# Patient Record
Sex: MALE | Race: ASIAN | NOT HISPANIC OR LATINO
[De-identification: names, ages, dates, MRNs, and addresses within clinical notes are randomized per-mention and may not be internally consistent; named-entity substitution may affect disease eponyms.]

---

## 2019-02-04 ENCOUNTER — OTHER (OUTPATIENT)
Age: 69
End: 2019-02-04

## 2019-12-28 ENCOUNTER — INPATIENT (INPATIENT)
Facility: HOSPITAL | Age: 69
LOS: 1 days | Discharge: HOME | DRG: 287 | End: 2019-12-30
Attending: INTERNAL MEDICINE | Admitting: INTERNAL MEDICINE
Payer: MEDICARE

## 2019-12-28 VITALS
OXYGEN SATURATION: 98 % | SYSTOLIC BLOOD PRESSURE: 134 MMHG | HEIGHT: 71 IN | RESPIRATION RATE: 18 BRPM | WEIGHT: 158.51 LBS | TEMPERATURE: 98 F | HEART RATE: 75 BPM | DIASTOLIC BLOOD PRESSURE: 76 MMHG

## 2019-12-28 DIAGNOSIS — E78.1 PURE HYPERGLYCERIDEMIA: ICD-10-CM

## 2019-12-28 DIAGNOSIS — R07.9 CHEST PAIN, UNSPECIFIED: ICD-10-CM

## 2019-12-28 LAB
ALBUMIN SERPL ELPH-MCNC: 4 G/DL — SIGNIFICANT CHANGE UP (ref 3.3–5)
ALP SERPL-CCNC: 87 U/L — SIGNIFICANT CHANGE UP (ref 40–120)
ALT FLD-CCNC: 18 U/L — SIGNIFICANT CHANGE UP (ref 10–45)
ANION GAP SERPL CALC-SCNC: 13 MMOL/L — SIGNIFICANT CHANGE UP (ref 5–17)
APTT BLD: 32.6 SEC — SIGNIFICANT CHANGE UP (ref 27.5–36.3)
AST SERPL-CCNC: 22 U/L — SIGNIFICANT CHANGE UP (ref 10–40)
BASOPHILS # BLD AUTO: 0.03 K/UL — SIGNIFICANT CHANGE UP (ref 0–0.2)
BASOPHILS NFR BLD AUTO: 0.6 % — SIGNIFICANT CHANGE UP (ref 0–2)
BILIRUB SERPL-MCNC: 0.3 MG/DL — SIGNIFICANT CHANGE UP (ref 0.2–1.2)
BUN SERPL-MCNC: 14 MG/DL — SIGNIFICANT CHANGE UP (ref 7–23)
CALCIUM SERPL-MCNC: 9.5 MG/DL — SIGNIFICANT CHANGE UP (ref 8.4–10.5)
CHLORIDE SERPL-SCNC: 105 MMOL/L — SIGNIFICANT CHANGE UP (ref 96–108)
CK MB CFR SERPL CALC: 1.7 NG/ML — SIGNIFICANT CHANGE UP (ref 0–6.7)
CK SERPL-CCNC: 105 U/L — SIGNIFICANT CHANGE UP (ref 30–200)
CO2 SERPL-SCNC: 23 MMOL/L — SIGNIFICANT CHANGE UP (ref 22–31)
CREAT SERPL-MCNC: 1.07 MG/DL — SIGNIFICANT CHANGE UP (ref 0.5–1.3)
EOSINOPHIL # BLD AUTO: 0.44 K/UL — SIGNIFICANT CHANGE UP (ref 0–0.5)
EOSINOPHIL NFR BLD AUTO: 8.2 % — HIGH (ref 0–6)
GLUCOSE SERPL-MCNC: 131 MG/DL — HIGH (ref 70–99)
HCT VFR BLD CALC: 45.8 % — SIGNIFICANT CHANGE UP (ref 39–50)
HGB BLD-MCNC: 14.8 G/DL — SIGNIFICANT CHANGE UP (ref 13–17)
IMM GRANULOCYTES NFR BLD AUTO: 0.4 % — SIGNIFICANT CHANGE UP (ref 0–1.5)
INR BLD: 1.1 — SIGNIFICANT CHANGE UP (ref 0.88–1.16)
LYMPHOCYTES # BLD AUTO: 2.09 K/UL — SIGNIFICANT CHANGE UP (ref 1–3.3)
LYMPHOCYTES # BLD AUTO: 38.8 % — SIGNIFICANT CHANGE UP (ref 13–44)
MCHC RBC-ENTMCNC: 29.6 PG — SIGNIFICANT CHANGE UP (ref 27–34)
MCHC RBC-ENTMCNC: 32.3 GM/DL — SIGNIFICANT CHANGE UP (ref 32–36)
MCV RBC AUTO: 91.6 FL — SIGNIFICANT CHANGE UP (ref 80–100)
MONOCYTES # BLD AUTO: 0.45 K/UL — SIGNIFICANT CHANGE UP (ref 0–0.9)
MONOCYTES NFR BLD AUTO: 8.3 % — SIGNIFICANT CHANGE UP (ref 2–14)
NEUTROPHILS # BLD AUTO: 2.36 K/UL — SIGNIFICANT CHANGE UP (ref 1.8–7.4)
NEUTROPHILS NFR BLD AUTO: 43.7 % — SIGNIFICANT CHANGE UP (ref 43–77)
NRBC # BLD: 0 /100 WBCS — SIGNIFICANT CHANGE UP (ref 0–0)
PLATELET # BLD AUTO: 180 K/UL — SIGNIFICANT CHANGE UP (ref 150–400)
POTASSIUM SERPL-MCNC: 4.3 MMOL/L — SIGNIFICANT CHANGE UP (ref 3.5–5.3)
POTASSIUM SERPL-SCNC: 4.3 MMOL/L — SIGNIFICANT CHANGE UP (ref 3.5–5.3)
PROT SERPL-MCNC: 6.9 G/DL — SIGNIFICANT CHANGE UP (ref 6–8.3)
PROTHROM AB SERPL-ACNC: 12.5 SEC — SIGNIFICANT CHANGE UP (ref 10–12.9)
RBC # BLD: 5 M/UL — SIGNIFICANT CHANGE UP (ref 4.2–5.8)
RBC # FLD: 12.9 % — SIGNIFICANT CHANGE UP (ref 10.3–14.5)
SODIUM SERPL-SCNC: 141 MMOL/L — SIGNIFICANT CHANGE UP (ref 135–145)
TROPONIN T SERPL-MCNC: <0.01 NG/ML — SIGNIFICANT CHANGE UP (ref 0–0.01)
TROPONIN T SERPL-MCNC: <0.01 NG/ML — SIGNIFICANT CHANGE UP (ref 0–0.01)
WBC # BLD: 5.39 K/UL — SIGNIFICANT CHANGE UP (ref 3.8–10.5)
WBC # FLD AUTO: 5.39 K/UL — SIGNIFICANT CHANGE UP (ref 3.8–10.5)

## 2019-12-28 PROCEDURE — 93306 TTE W/DOPPLER COMPLETE: CPT | Mod: 26

## 2019-12-28 PROCEDURE — 93010 ELECTROCARDIOGRAM REPORT: CPT

## 2019-12-28 PROCEDURE — 99285 EMERGENCY DEPT VISIT HI MDM: CPT

## 2019-12-28 PROCEDURE — 71046 X-RAY EXAM CHEST 2 VIEWS: CPT | Mod: 26

## 2019-12-28 RX ORDER — ASPIRIN/CALCIUM CARB/MAGNESIUM 324 MG
81 TABLET ORAL DAILY
Refills: 0 | Status: DISCONTINUED | OUTPATIENT
Start: 2019-12-29 | End: 2019-12-30

## 2019-12-28 RX ORDER — NICOTINE POLACRILEX 2 MG
1 GUM BUCCAL DAILY
Refills: 0 | Status: DISCONTINUED | OUTPATIENT
Start: 2019-12-28 | End: 2019-12-30

## 2019-12-28 RX ORDER — CLOPIDOGREL BISULFATE 75 MG/1
75 TABLET, FILM COATED ORAL DAILY
Refills: 0 | Status: DISCONTINUED | OUTPATIENT
Start: 2019-12-29 | End: 2019-12-30

## 2019-12-28 RX ORDER — ENOXAPARIN SODIUM 100 MG/ML
40 INJECTION SUBCUTANEOUS EVERY 24 HOURS
Refills: 0 | Status: DISCONTINUED | OUTPATIENT
Start: 2019-12-28 | End: 2019-12-29

## 2019-12-28 RX ORDER — ACETAMINOPHEN 500 MG
650 TABLET ORAL ONCE
Refills: 0 | Status: COMPLETED | OUTPATIENT
Start: 2019-12-28 | End: 2019-12-28

## 2019-12-28 RX ORDER — GEMFIBROZIL 600 MG
600 TABLET ORAL
Refills: 0 | Status: DISCONTINUED | OUTPATIENT
Start: 2019-12-28 | End: 2019-12-30

## 2019-12-28 RX ORDER — CLOPIDOGREL BISULFATE 75 MG/1
600 TABLET, FILM COATED ORAL ONCE
Refills: 0 | Status: COMPLETED | OUTPATIENT
Start: 2019-12-28 | End: 2019-12-28

## 2019-12-28 RX ORDER — ASPIRIN/CALCIUM CARB/MAGNESIUM 324 MG
1 TABLET ORAL
Qty: 0 | Refills: 0 | DISCHARGE

## 2019-12-28 RX ORDER — ASPIRIN/CALCIUM CARB/MAGNESIUM 324 MG
325 TABLET ORAL ONCE
Refills: 0 | Status: DISCONTINUED | OUTPATIENT
Start: 2019-12-28 | End: 2019-12-28

## 2019-12-28 RX ORDER — NITROGLYCERIN 6.5 MG
1 CAPSULE, EXTENDED RELEASE ORAL ONCE
Refills: 0 | Status: COMPLETED | OUTPATIENT
Start: 2019-12-28 | End: 2019-12-28

## 2019-12-28 RX ADMIN — ENOXAPARIN SODIUM 40 MILLIGRAM(S): 100 INJECTION SUBCUTANEOUS at 19:19

## 2019-12-28 RX ADMIN — Medication 600 MILLIGRAM(S): at 23:52

## 2019-12-28 RX ADMIN — Medication 1 PATCH: at 23:52

## 2019-12-28 RX ADMIN — Medication 1 INCH(S): at 20:02

## 2019-12-28 RX ADMIN — CLOPIDOGREL BISULFATE 600 MILLIGRAM(S): 75 TABLET, FILM COATED ORAL at 19:18

## 2019-12-28 NOTE — ED PROVIDER NOTE - CLINICAL SUMMARY MEDICAL DECISION MAKING FREE TEXT BOX
here w/ concern for unstable angina/ seen by cards fellow and cards attending at bedside. plan to admit for medical optimization and likely urgent cath

## 2019-12-28 NOTE — H&P ADULT - PROBLEM SELECTOR PLAN 1
currently CP free  -trop negative x 1, f/u repeat at 8pm and AM  -EKG non ischemic  -Echo: EF 55% hypokinesis of the basal anterolateral wall, mild AR  -plan for cardiac cath with Dr. Dixon on Monday, pt consented, NPO after MN on sunday, s/p ASA 325mg and Plavix 600mg today  -start ASA 81mg, Plavix 75mg and metoprolol 25mg QD currently CP free  -trop negative x 1, f/u repeat at 8pm and AM  -EKG non ischemic  -Echo: EF 55% hypokinesis of the basal anterolateral wall, mild AR  -plan for cardiac cath with Dr. Dixon on Monday, pt consented, NPO after MN on sunday, s/p ASA 325mg and Plavix 600mg today  -discuss with Dr. Dixon regarding shellfish allergy (rash and swelling)  -f/u A1C and lipids in AM  -start ASA 81mg, Plavix 75mg and metoprolol 25mg QD currently CP free  -trop negative x 1, f/u repeat at 8pm and AM  -EKG non ischemic  -Echo: EF 55% hypokinesis of the basal anterolateral wall, mild AR  -plan for cardiac cath with Dr. Dixon on Monday, pt consented, NPO after MN on sunday, s/p ASA 162mg today at home, will give Plavix 600mg today  -discuss with Dr. Dixon regarding shellfish allergy (rash and swelling)  -f/u A1C and lipids in AM  -continue ASA 81mg, and start Plavix 75mg and metoprolol 25mg QD

## 2019-12-28 NOTE — H&P ADULT - NSHPLABSRESULTS_GEN_ALL_CORE
14.8   5.39  )-----------( 180      ( 28 Dec 2019 16:13 )             45.8       12-28    141  |  105  |  14  ----------------------------<  131<H>  4.3   |  23  |  1.07    Ca    9.5      28 Dec 2019 16:13    TPro  6.9  /  Alb  4.0  /  TBili  0.3  /  DBili  x   /  AST  22  /  ALT  18  /  AlkPhos  87  12-28      PT/INR - ( 28 Dec 2019 16:13 )   PT: 12.5 sec;   INR: 1.10          PTT - ( 28 Dec 2019 16:13 )  PTT:32.6 sec    CARDIAC MARKERS ( 28 Dec 2019 16:13 )  x     / <0.01 ng/mL / 105 U/L / x     / 1.7 ng/mL        EKG: NSR, no ST-T changes

## 2019-12-28 NOTE — ED ADULT NURSE NOTE - OBJECTIVE STATEMENT
Patient presents to the ED complaining of Patient presents to the ED complaining of left sided chest pain for the past 2-3 days. Patient reports that he would talk a baby aspirin and it would go away for 30 minutes and then it would come right back. Denies any nausea or vomiting. Patient placed on the monitor. Labs sent. Patient denies any medical history. Not currently on medications.

## 2019-12-28 NOTE — ED ADULT TRIAGE NOTE - CHIEF COMPLAINT QUOTE
co left sided chest pain under left breast that started ~ 3 days ago. Denies sob, dizziness or fall/injury

## 2019-12-28 NOTE — H&P ADULT - HISTORY OF PRESENT ILLNESS
70yo Male, non compliant, with SHELLFISH ALLERGY, PMHx of HTN, HLD who presented to [facility] ED (12/28/19) c/o CP x __. Pain is __________. He denies ____ palpitations, dizziness, syncope, diaphoresis, fatigue, LE edema, SOB, SINHA, orthopnea, PND, N/V, fever or chills.  On arrival to ED, VSS, labs significant for trop negative x 1, EKG revealed NSR no ST-T changes, Echo revealed EF 55% hypokinesis of the basal anterolateral wall, mild AR and CXR revealed no acute pulmonary pathology.  Pt now admitted to [facility] for further management of CP and R/O ACS. 70yo Male, current smoker, with SHELLFISH ALLERGY, PMHx of hypertriglyceridemia (on Lopid, non compliant) who presented to [facility] ED (12/28/19) c/o CP x 1 week. Pain is left sided, non radiating, described as tightness that occurs with exertion, >2 blocks, that is relieved by ASA 81mg however returns after 30 minutes. He denies palpitations, dizziness, syncope, diaphoresis, fatigue, LE edema, SOB, SINHA, orthopnea, PND, N/V, fever or chills.  On arrival to ED, VSS, labs significant for trop negative x 1, EKG revealed NSR no ST-T changes, Echo revealed EF 55% hypokinesis of the basal anterolateral wall, mild AR and CXR revealed no acute pulmonary pathology.  Pt now admitted to [facility] for further management of CP and R/O ACS.

## 2019-12-28 NOTE — H&P ADULT - PROBLEM SELECTOR PLAN 2
non compliant, on lopin at home  -f/u AM lipids and start statin accordingly      VTE ppx: Lovenox SQ  Dispo: pending cath Monday non compliant  -f/u AM lipids  -continue home gemfibrizol 600mg BID      VTE ppx: Lovenox SQ  Dispo: pending cath Monday

## 2019-12-28 NOTE — H&P ADULT - RS GEN PE MLT RESP DETAILS PC
no intercostal retractions/no rales/no wheezes/breath sounds equal/respirations non-labored/airway patent/good air movement/no rhonchi

## 2019-12-28 NOTE — ED PROVIDER NOTE - OBJECTIVE STATEMENT
68yo Male, current smoker,  PMHx of hypertriglyceridemia (on Lopid, non compliant) who presented to [facility] ED (12/28/19) c/o CP x 1 week. Pain is left sided, non radiating, described as tightness that occurs with exertion, >2 blocks, that is relieved by ASA 81mg however returns after 30 minutes. He denies palpitations, dizziness, syncope, diaphoresis, fatigue, LE edema, SOB, SINHA, orthopnea, PND, N/V, fever or chills.

## 2019-12-28 NOTE — H&P ADULT - ASSESSMENT
70yo Male, current smoker, with SHELLFISH ALLERGY, PMHx of hypertriglyceridemia (on Lopid, non compliant) who presents to [facility] c/o CP, now admitted to [facility] for further management and cardiac catheterization Monday.

## 2019-12-29 DIAGNOSIS — G62.9 POLYNEUROPATHY, UNSPECIFIED: ICD-10-CM

## 2019-12-29 DIAGNOSIS — R73.03 PREDIABETES: ICD-10-CM

## 2019-12-29 LAB
24R-OH-CALCIDIOL SERPL-MCNC: 21.8 NG/ML — LOW (ref 30–80)
ANION GAP SERPL CALC-SCNC: 12 MMOL/L — SIGNIFICANT CHANGE UP (ref 5–17)
BUN SERPL-MCNC: 17 MG/DL — SIGNIFICANT CHANGE UP (ref 7–23)
CALCIUM SERPL-MCNC: 9.2 MG/DL — SIGNIFICANT CHANGE UP (ref 8.4–10.5)
CHLORIDE SERPL-SCNC: 106 MMOL/L — SIGNIFICANT CHANGE UP (ref 96–108)
CHOLEST SERPL-MCNC: 201 MG/DL — HIGH (ref 10–199)
CK MB CFR SERPL CALC: 1.4 NG/ML — SIGNIFICANT CHANGE UP (ref 0–6.7)
CK SERPL-CCNC: 75 U/L — SIGNIFICANT CHANGE UP (ref 30–200)
CO2 SERPL-SCNC: 24 MMOL/L — SIGNIFICANT CHANGE UP (ref 22–31)
CREAT SERPL-MCNC: 1.19 MG/DL — SIGNIFICANT CHANGE UP (ref 0.5–1.3)
FOLATE SERPL-MCNC: 11.9 NG/ML — SIGNIFICANT CHANGE UP
GLUCOSE BLDC GLUCOMTR-MCNC: 104 MG/DL — HIGH (ref 70–99)
GLUCOSE BLDC GLUCOMTR-MCNC: 78 MG/DL — SIGNIFICANT CHANGE UP (ref 70–99)
GLUCOSE BLDC GLUCOMTR-MCNC: 83 MG/DL — SIGNIFICANT CHANGE UP (ref 70–99)
GLUCOSE SERPL-MCNC: 163 MG/DL — HIGH (ref 70–99)
HBA1C BLD-MCNC: 6.2 % — HIGH (ref 4–5.6)
HCT VFR BLD CALC: 46.2 % — SIGNIFICANT CHANGE UP (ref 39–50)
HCV AB S/CO SERPL IA: 0.15 S/CO — SIGNIFICANT CHANGE UP
HCV AB SERPL-IMP: SIGNIFICANT CHANGE UP
HDLC SERPL-MCNC: 33 MG/DL — LOW
HGB BLD-MCNC: 14.5 G/DL — SIGNIFICANT CHANGE UP (ref 13–17)
LIPID PNL WITH DIRECT LDL SERPL: 104 MG/DL — HIGH
MAGNESIUM SERPL-MCNC: 2 MG/DL — SIGNIFICANT CHANGE UP (ref 1.6–2.6)
MCHC RBC-ENTMCNC: 29 PG — SIGNIFICANT CHANGE UP (ref 27–34)
MCHC RBC-ENTMCNC: 31.4 GM/DL — LOW (ref 32–36)
MCV RBC AUTO: 92.4 FL — SIGNIFICANT CHANGE UP (ref 80–100)
NRBC # BLD: 0 /100 WBCS — SIGNIFICANT CHANGE UP (ref 0–0)
PLATELET # BLD AUTO: 174 K/UL — SIGNIFICANT CHANGE UP (ref 150–400)
POTASSIUM SERPL-MCNC: 4 MMOL/L — SIGNIFICANT CHANGE UP (ref 3.5–5.3)
POTASSIUM SERPL-SCNC: 4 MMOL/L — SIGNIFICANT CHANGE UP (ref 3.5–5.3)
RBC # BLD: 5 M/UL — SIGNIFICANT CHANGE UP (ref 4.2–5.8)
RBC # FLD: 12.9 % — SIGNIFICANT CHANGE UP (ref 10.3–14.5)
SODIUM SERPL-SCNC: 142 MMOL/L — SIGNIFICANT CHANGE UP (ref 135–145)
TOTAL CHOLESTEROL/HDL RATIO MEASUREMENT: 6.1 RATIO — SIGNIFICANT CHANGE UP (ref 3.4–9.6)
TRIGL SERPL-MCNC: 321 MG/DL — HIGH (ref 10–149)
TROPONIN T SERPL-MCNC: <0.01 NG/ML — SIGNIFICANT CHANGE UP (ref 0–0.01)
VIT B12 SERPL-MCNC: 636 PG/ML — SIGNIFICANT CHANGE UP (ref 232–1245)
VIT D25+D1,25 OH+D1,25 PNL SERPL-MCNC: 21.9 PG/ML — SIGNIFICANT CHANGE UP (ref 19.9–79.3)
WBC # BLD: 6.06 K/UL — SIGNIFICANT CHANGE UP (ref 3.8–10.5)
WBC # FLD AUTO: 6.06 K/UL — SIGNIFICANT CHANGE UP (ref 3.8–10.5)

## 2019-12-29 RX ORDER — METOPROLOL TARTRATE 50 MG
12.5 TABLET ORAL
Refills: 0 | Status: DISCONTINUED | OUTPATIENT
Start: 2019-12-29 | End: 2019-12-30

## 2019-12-29 RX ORDER — GABAPENTIN 400 MG/1
100 CAPSULE ORAL THREE TIMES A DAY
Refills: 0 | Status: DISCONTINUED | OUTPATIENT
Start: 2019-12-29 | End: 2019-12-30

## 2019-12-29 RX ORDER — ATORVASTATIN CALCIUM 80 MG/1
80 TABLET, FILM COATED ORAL AT BEDTIME
Refills: 0 | Status: DISCONTINUED | OUTPATIENT
Start: 2019-12-29 | End: 2019-12-30

## 2019-12-29 RX ORDER — SODIUM CHLORIDE 9 MG/ML
1000 INJECTION, SOLUTION INTRAVENOUS
Refills: 0 | Status: DISCONTINUED | OUTPATIENT
Start: 2019-12-29 | End: 2019-12-30

## 2019-12-29 RX ORDER — DEXTROSE 50 % IN WATER 50 %
15 SYRINGE (ML) INTRAVENOUS ONCE
Refills: 0 | Status: DISCONTINUED | OUTPATIENT
Start: 2019-12-29 | End: 2019-12-30

## 2019-12-29 RX ORDER — ATORVASTATIN CALCIUM 80 MG/1
40 TABLET, FILM COATED ORAL AT BEDTIME
Refills: 0 | Status: DISCONTINUED | OUTPATIENT
Start: 2019-12-29 | End: 2019-12-29

## 2019-12-29 RX ORDER — GLUCAGON INJECTION, SOLUTION 0.5 MG/.1ML
1 INJECTION, SOLUTION SUBCUTANEOUS ONCE
Refills: 0 | Status: DISCONTINUED | OUTPATIENT
Start: 2019-12-29 | End: 2019-12-30

## 2019-12-29 RX ORDER — INSULIN LISPRO 100/ML
VIAL (ML) SUBCUTANEOUS
Refills: 0 | Status: DISCONTINUED | OUTPATIENT
Start: 2019-12-29 | End: 2019-12-30

## 2019-12-29 RX ORDER — DEXTROSE 50 % IN WATER 50 %
25 SYRINGE (ML) INTRAVENOUS ONCE
Refills: 0 | Status: DISCONTINUED | OUTPATIENT
Start: 2019-12-29 | End: 2019-12-30

## 2019-12-29 RX ORDER — DEXTROSE 50 % IN WATER 50 %
12.5 SYRINGE (ML) INTRAVENOUS ONCE
Refills: 0 | Status: DISCONTINUED | OUTPATIENT
Start: 2019-12-29 | End: 2019-12-30

## 2019-12-29 RX ADMIN — GABAPENTIN 100 MILLIGRAM(S): 400 CAPSULE ORAL at 13:56

## 2019-12-29 RX ADMIN — ATORVASTATIN CALCIUM 80 MILLIGRAM(S): 80 TABLET, FILM COATED ORAL at 21:30

## 2019-12-29 RX ADMIN — Medication 81 MILLIGRAM(S): at 11:17

## 2019-12-29 RX ADMIN — GABAPENTIN 100 MILLIGRAM(S): 400 CAPSULE ORAL at 21:30

## 2019-12-29 RX ADMIN — Medication 650 MILLIGRAM(S): at 00:20

## 2019-12-29 RX ADMIN — Medication 1 PATCH: at 18:09

## 2019-12-29 RX ADMIN — Medication 1 PATCH: at 11:16

## 2019-12-29 RX ADMIN — Medication 600 MILLIGRAM(S): at 17:54

## 2019-12-29 RX ADMIN — Medication 600 MILLIGRAM(S): at 06:31

## 2019-12-29 RX ADMIN — CLOPIDOGREL BISULFATE 75 MILLIGRAM(S): 75 TABLET, FILM COATED ORAL at 11:17

## 2019-12-29 RX ADMIN — Medication 1 TABLET(S): at 11:17

## 2019-12-29 RX ADMIN — Medication 1 INCH(S): at 08:11

## 2019-12-29 RX ADMIN — Medication 12.5 MILLIGRAM(S): at 17:54

## 2019-12-29 RX ADMIN — Medication 1 PATCH: at 07:42

## 2019-12-29 RX ADMIN — Medication 1 PATCH: at 23:36

## 2019-12-29 RX ADMIN — SODIUM CHLORIDE 50 MILLILITER(S): 9 INJECTION, SOLUTION INTRAVENOUS at 11:17

## 2019-12-29 RX ADMIN — Medication 12.5 MILLIGRAM(S): at 11:17

## 2019-12-29 RX ADMIN — Medication 650 MILLIGRAM(S): at 01:00

## 2019-12-29 NOTE — PROGRESS NOTE ADULT - PROBLEM SELECTOR PLAN 2
, ASCVD risk score suggests moderate-high intensity statin  - Started Atorvastatin 80mg QHS and continue home Gemfibrizol 600mg BID

## 2019-12-29 NOTE — PROGRESS NOTE ADULT - SUBJECTIVE AND OBJECTIVE BOX
Interventional Cardiology PA Adult Progress Note    C.C.:     Subjective Assessment:      12 Point ROS Negative except as per Subjective and HPI  	  MEDICATIONS:  gemfibrozil 600 milliGRAM(s) Oral two times a day  aspirin enteric coated 81 milliGRAM(s) Oral daily  clopidogrel Tablet 75 milliGRAM(s) Oral daily      PHYSICAL EXAM:  TELEMETRY:  T(C): 36.9 (12-29-19 @ 09:56), Max: 36.9 (12-29-19 @ 09:56)  HR: 74 (12-29-19 @ 08:50) (60 - 79)  BP: 130/67 (12-29-19 @ 08:50) (111/58 - 140/70)  RR: 16 (12-29-19 @ 08:50) (16 - 18)  SpO2: 96% (12-29-19 @ 08:50) (96% - 99%)  Wt(kg): --  I&O's Summary    Height (cm): 180.34 (12-28 @ 15:29)  Weight (kg): 71.9 (12-28 @ 15:29)  BMI (kg/m2): 22.1 (12-28 @ 15:29)  BSA (m2): 1.91 (12-28 @ 15:29)                                       Appearance: Normal	  HEENT:   Normal oral mucosa, PERRL, EOMI	  Neck: Supple, + JVD/ - JVD; Carotid Bruit   Cardiovascular: Normal S1 S2, No JVD, No murmurs,   Respiratory: Lungs clear to auscultation/Decreased Breath Sounds/No Rales, Rhonchi, Wheezing	  Gastrointestinal:  Soft, Non-tender, + BS	  Skin: No rashes, No ecchymoses, No cyanosis  Extremities: Normal range of motion, No clubbing, cyanosis or edema  Vascular: Peripheral pulses palpable 2+ bilaterally  Neurologic: Non-focal  Psychiatry: A & O x 3, Mood & affect appropriate    CARDIAC MARKERS ( 29 Dec 2019 06:34 )  x     / <0.01 ng/mL / 75 U/L / x     / 1.4 ng/mL  CARDIAC MARKERS ( 28 Dec 2019 20:04 )  x     / <0.01 ng/mL / x     / x     / x      CARDIAC MARKERS ( 28 Dec 2019 16:13 )  x     / <0.01 ng/mL / 105 U/L / x     / 1.7 ng/mL                                14.5   6.06  )-----------( 174      ( 29 Dec 2019 06:34 )             46.2     12-29    142  |  106  |  17  ----------------------------<  163<H>  4.0   |  24  |  1.19    Ca    9.2      29 Dec 2019 06:34  Mg     2.0     12-29    TPro  6.9  /  Alb  4.0  /  TBili  0.3  /  DBili  x   /  AST  22  /  ALT  18  /  AlkPhos  87  12-28    proBNP:   Lipid Profile:   HgA1c: Hemoglobin A1C, Whole Blood: 6.2 % (12-29 @ 06:34)    TSH:   PT/INR - ( 28 Dec 2019 16:13 )   PT: 12.5 sec;   INR: 1.10          PTT - ( 28 Dec 2019 16:13 )  PTT:32.6 sec    ASSESSMENT/PLAN: 	        DVT ppx:  Dispo: Interventional Cardiology PA Adult Progress Note    C.C.: CP on admission    Subjective Assessment: Pt seen and examined at bedside this AM. Pt had NTG patch placed last night for CP, which has since been removed without recurrence of CP. Pt does note some burning sensation in Right foot.      12 Point ROS Negative except as per Subjective and HPI  	  MEDICATIONS:  gemfibrozil 600 milliGRAM(s) Oral two times a day  aspirin enteric coated 81 milliGRAM(s) Oral daily  clopidogrel Tablet 75 milliGRAM(s) Oral daily      PHYSICAL EXAM:  TELEMETRY:  T(C): 36.9 (12-29-19 @ 09:56), Max: 36.9 (12-29-19 @ 09:56)  HR: 74 (12-29-19 @ 08:50) (60 - 79)  BP: 130/67 (12-29-19 @ 08:50) (111/58 - 140/70)  RR: 16 (12-29-19 @ 08:50) (16 - 18)  SpO2: 96% (12-29-19 @ 08:50) (96% - 99%)  Wt(kg): --  I&O's Summary    Height (cm): 180.34 (12-28 @ 15:29)  Weight (kg): 71.9 (12-28 @ 15:29)  BMI (kg/m2): 22.1 (12-28 @ 15:29)  BSA (m2): 1.91 (12-28 @ 15:29)                                       Appearance: NAD	  HEENT: Normal oral mucosa, PERRL, EOMI	  Neck: Supple, - JVD; No Carotid Bruit   Cardiovascular: Normal S1 S2, No murmurs  Respiratory: Lungs clear to auscultation/No Rales, Rhonchi, Wheezing	  Gastrointestinal:  Soft, Non-tender, + BS	  Skin: No rashes, No ecchymoses, No cyanosis  Extremities: Normal range of motion, No clubbing, cyanosis or edema  Vascular: Peripheral pulses palpable 2+ bilaterally  Neurologic: Non-focal  Psychiatry: A & O x 3, Mood & affect appropriate      Labs:  CARDIAC MARKERS ( 29 Dec 2019 06:34 )  x     / <0.01 ng/mL / 75 U/L / x     / 1.4 ng/mL  CARDIAC MARKERS ( 28 Dec 2019 20:04 )  x     / <0.01 ng/mL / x     / x     / x      CARDIAC MARKERS ( 28 Dec 2019 16:13 )  x     / <0.01 ng/mL / 105 U/L / x     / 1.7 ng/mL                          14.5   6.06  )-----------( 174      ( 29 Dec 2019 06:34 )             46.2     12-29    142  |  106  |  17  ----------------------------<  163<H>  4.0   |  24  |  1.19    Ca    9.2      29 Dec 2019 06:34  Mg     2.0     12-29    TPro  6.9  /  Alb  4.0  /  TBili  0.3  /  DBili  x   /  AST  22  /  ALT  18  /  AlkPhos  87  12-28    proBNP:   Lipid Profile:   HgA1c: Hemoglobin A1C, Whole Blood: 6.2 % (12-29 @ 06:34)    TSH:   PT/INR - ( 28 Dec 2019 16:13 )   PT: 12.5 sec;   INR: 1.10          PTT - ( 28 Dec 2019 16:13 )  PTT:32.6 sec

## 2019-12-29 NOTE — PROGRESS NOTE ADULT - PROBLEM SELECTOR PLAN 4
Pt c/o "burning" in Right foot  - Per Dr. Saez, started Gabapentin 100mg TID    VTE ppx: holding Lovenox for cardiac cath in AM  Dispo: NPO after midnight for cath tomorrow

## 2019-12-29 NOTE — PROGRESS NOTE ADULT - ASSESSMENT
68yo Male, current smoker, with SHELLFISH ALLERGY, PMHx of hypertriglyceridemia (on Lopid, non compliant) who presents to [facility] c/o CP, now [facility] for further management and cardiac catheterization Monday.

## 2019-12-29 NOTE — PROGRESS NOTE ADULT - PROBLEM SELECTOR PLAN 1
currently CP free and HD stable  - Trop negative x 3, EKG non-ischemic  - Echo: EF 55% hypokinesis of the basal anterolateral wall, mild AR  - NPO after midnight for cardiac cath with Dr. Dixon on Monday, pt consented  - s/p ASA/Plavix load, c/w ASA 81mg QD and Plavix 75mg QD  - Discuss with Dr. Dixon regarding shellfish allergy (rash and swelling)  - Started Metoprolol Succinate 12.5mg BID  - IVF 1/2NS @ 50cc/hr ordered as precath fluids per Dr. Saez  - Smoking cessation d/w pt, started Nicotine patch

## 2019-12-30 ENCOUNTER — TRANSCRIPTION ENCOUNTER (OUTPATIENT)
Age: 69
End: 2019-12-30

## 2019-12-30 ENCOUNTER — INBOUND DOCUMENT (OUTPATIENT)
Age: 69
End: 2019-12-30

## 2019-12-30 VITALS
HEART RATE: 75 BPM | RESPIRATION RATE: 17 BRPM | OXYGEN SATURATION: 95 % | DIASTOLIC BLOOD PRESSURE: 55 MMHG | SYSTOLIC BLOOD PRESSURE: 113 MMHG

## 2019-12-30 DIAGNOSIS — G62.9 POLYNEUROPATHY, UNSPECIFIED: ICD-10-CM

## 2019-12-30 LAB
ANION GAP SERPL CALC-SCNC: 13 MMOL/L — SIGNIFICANT CHANGE UP (ref 5–17)
APTT BLD: 31 SEC — SIGNIFICANT CHANGE UP (ref 27.5–36.3)
BASOPHILS # BLD AUTO: 0.04 K/UL — SIGNIFICANT CHANGE UP (ref 0–0.2)
BASOPHILS NFR BLD AUTO: 0.6 % — SIGNIFICANT CHANGE UP (ref 0–2)
BUN SERPL-MCNC: 18 MG/DL — SIGNIFICANT CHANGE UP (ref 7–23)
CALCIUM SERPL-MCNC: 9.2 MG/DL — SIGNIFICANT CHANGE UP (ref 8.4–10.5)
CHLORIDE SERPL-SCNC: 105 MMOL/L — SIGNIFICANT CHANGE UP (ref 96–108)
CO2 SERPL-SCNC: 22 MMOL/L — SIGNIFICANT CHANGE UP (ref 22–31)
CREAT SERPL-MCNC: 1.13 MG/DL — SIGNIFICANT CHANGE UP (ref 0.5–1.3)
EOSINOPHIL # BLD AUTO: 0.44 K/UL — SIGNIFICANT CHANGE UP (ref 0–0.5)
EOSINOPHIL NFR BLD AUTO: 7 % — HIGH (ref 0–6)
GLUCOSE BLDC GLUCOMTR-MCNC: 102 MG/DL — HIGH (ref 70–99)
GLUCOSE BLDC GLUCOMTR-MCNC: 87 MG/DL — SIGNIFICANT CHANGE UP (ref 70–99)
GLUCOSE SERPL-MCNC: 85 MG/DL — SIGNIFICANT CHANGE UP (ref 70–99)
HCT VFR BLD CALC: 45.6 % — SIGNIFICANT CHANGE UP (ref 39–50)
HGB BLD-MCNC: 15 G/DL — SIGNIFICANT CHANGE UP (ref 13–17)
IMM GRANULOCYTES NFR BLD AUTO: 0.2 % — SIGNIFICANT CHANGE UP (ref 0–1.5)
INR BLD: 1.14 — SIGNIFICANT CHANGE UP (ref 0.88–1.16)
LYMPHOCYTES # BLD AUTO: 2.44 K/UL — SIGNIFICANT CHANGE UP (ref 1–3.3)
LYMPHOCYTES # BLD AUTO: 39 % — SIGNIFICANT CHANGE UP (ref 13–44)
MAGNESIUM SERPL-MCNC: 2 MG/DL — SIGNIFICANT CHANGE UP (ref 1.6–2.6)
MCHC RBC-ENTMCNC: 29.4 PG — SIGNIFICANT CHANGE UP (ref 27–34)
MCHC RBC-ENTMCNC: 32.9 GM/DL — SIGNIFICANT CHANGE UP (ref 32–36)
MCV RBC AUTO: 89.4 FL — SIGNIFICANT CHANGE UP (ref 80–100)
MONOCYTES # BLD AUTO: 0.48 K/UL — SIGNIFICANT CHANGE UP (ref 0–0.9)
MONOCYTES NFR BLD AUTO: 7.7 % — SIGNIFICANT CHANGE UP (ref 2–14)
NEUTROPHILS # BLD AUTO: 2.84 K/UL — SIGNIFICANT CHANGE UP (ref 1.8–7.4)
NEUTROPHILS NFR BLD AUTO: 45.5 % — SIGNIFICANT CHANGE UP (ref 43–77)
NRBC # BLD: 0 /100 WBCS — SIGNIFICANT CHANGE UP (ref 0–0)
PLATELET # BLD AUTO: 164 K/UL — SIGNIFICANT CHANGE UP (ref 150–400)
POTASSIUM SERPL-MCNC: 4.1 MMOL/L — SIGNIFICANT CHANGE UP (ref 3.5–5.3)
POTASSIUM SERPL-SCNC: 4.1 MMOL/L — SIGNIFICANT CHANGE UP (ref 3.5–5.3)
PROTHROM AB SERPL-ACNC: 12.9 SEC — SIGNIFICANT CHANGE UP (ref 10–12.9)
RBC # BLD: 5.1 M/UL — SIGNIFICANT CHANGE UP (ref 4.2–5.8)
RBC # FLD: 12.8 % — SIGNIFICANT CHANGE UP (ref 10.3–14.5)
SODIUM SERPL-SCNC: 140 MMOL/L — SIGNIFICANT CHANGE UP (ref 135–145)
WBC # BLD: 6.25 K/UL — SIGNIFICANT CHANGE UP (ref 3.8–10.5)
WBC # FLD AUTO: 6.25 K/UL — SIGNIFICANT CHANGE UP (ref 3.8–10.5)

## 2019-12-30 PROCEDURE — 93458 L HRT ARTERY/VENTRICLE ANGIO: CPT | Mod: 26

## 2019-12-30 PROCEDURE — C1769: CPT

## 2019-12-30 PROCEDURE — 82962 GLUCOSE BLOOD TEST: CPT

## 2019-12-30 PROCEDURE — 82652 VIT D 1 25-DIHYDROXY: CPT

## 2019-12-30 PROCEDURE — 99285 EMERGENCY DEPT VISIT HI MDM: CPT | Mod: 25

## 2019-12-30 PROCEDURE — 83735 ASSAY OF MAGNESIUM: CPT

## 2019-12-30 PROCEDURE — 80048 BASIC METABOLIC PNL TOTAL CA: CPT

## 2019-12-30 PROCEDURE — 80061 LIPID PANEL: CPT

## 2019-12-30 PROCEDURE — 85610 PROTHROMBIN TIME: CPT

## 2019-12-30 PROCEDURE — 83036 HEMOGLOBIN GLYCOSYLATED A1C: CPT

## 2019-12-30 PROCEDURE — 86803 HEPATITIS C AB TEST: CPT

## 2019-12-30 PROCEDURE — 82746 ASSAY OF FOLIC ACID SERUM: CPT

## 2019-12-30 PROCEDURE — C1887: CPT

## 2019-12-30 PROCEDURE — 85027 COMPLETE CBC AUTOMATED: CPT

## 2019-12-30 PROCEDURE — 84484 ASSAY OF TROPONIN QUANT: CPT

## 2019-12-30 PROCEDURE — 36415 COLL VENOUS BLD VENIPUNCTURE: CPT

## 2019-12-30 PROCEDURE — 93306 TTE W/DOPPLER COMPLETE: CPT

## 2019-12-30 PROCEDURE — 82607 VITAMIN B-12: CPT

## 2019-12-30 PROCEDURE — 82550 ASSAY OF CK (CPK): CPT

## 2019-12-30 PROCEDURE — 82553 CREATINE MB FRACTION: CPT

## 2019-12-30 PROCEDURE — 85025 COMPLETE CBC W/AUTO DIFF WBC: CPT

## 2019-12-30 PROCEDURE — 71046 X-RAY EXAM CHEST 2 VIEWS: CPT

## 2019-12-30 PROCEDURE — 85730 THROMBOPLASTIN TIME PARTIAL: CPT

## 2019-12-30 PROCEDURE — 80053 COMPREHEN METABOLIC PANEL: CPT

## 2019-12-30 PROCEDURE — 93005 ELECTROCARDIOGRAM TRACING: CPT

## 2019-12-30 PROCEDURE — 82306 VITAMIN D 25 HYDROXY: CPT

## 2019-12-30 PROCEDURE — C1894: CPT

## 2019-12-30 RX ORDER — METOPROLOL TARTRATE 50 MG
1 TABLET ORAL
Qty: 30 | Refills: 11
Start: 2019-12-30 | End: 2020-12-23

## 2019-12-30 RX ORDER — GABAPENTIN 400 MG/1
1 CAPSULE ORAL
Qty: 0 | Refills: 0 | DISCHARGE
Start: 2019-12-30

## 2019-12-30 RX ORDER — ATORVASTATIN CALCIUM 80 MG/1
1 TABLET, FILM COATED ORAL
Qty: 0 | Refills: 0 | DISCHARGE
Start: 2019-12-30

## 2019-12-30 RX ORDER — DIPHENHYDRAMINE HCL 50 MG
50 CAPSULE ORAL ONCE
Refills: 0 | Status: DISCONTINUED | OUTPATIENT
Start: 2019-12-30 | End: 2019-12-30

## 2019-12-30 RX ORDER — HYDROCORTISONE 20 MG
200 TABLET ORAL ONCE
Refills: 0 | Status: DISCONTINUED | OUTPATIENT
Start: 2019-12-30 | End: 2019-12-30

## 2019-12-30 RX ORDER — NICOTINE POLACRILEX 2 MG
1 GUM BUCCAL
Qty: 30 | Refills: 2
Start: 2019-12-30 | End: 2020-03-28

## 2019-12-30 RX ORDER — SODIUM CHLORIDE 9 MG/ML
1000 INJECTION INTRAMUSCULAR; INTRAVENOUS; SUBCUTANEOUS
Refills: 0 | Status: DISCONTINUED | OUTPATIENT
Start: 2019-12-30 | End: 2019-12-30

## 2019-12-30 RX ADMIN — Medication 12.5 MILLIGRAM(S): at 06:00

## 2019-12-30 RX ADMIN — Medication 81 MILLIGRAM(S): at 06:11

## 2019-12-30 RX ADMIN — GABAPENTIN 100 MILLIGRAM(S): 400 CAPSULE ORAL at 06:00

## 2019-12-30 RX ADMIN — SODIUM CHLORIDE 75 MILLILITER(S): 9 INJECTION INTRAMUSCULAR; INTRAVENOUS; SUBCUTANEOUS at 11:47

## 2019-12-30 RX ADMIN — Medication 1 TABLET(S): at 11:47

## 2019-12-30 RX ADMIN — Medication 600 MILLIGRAM(S): at 06:00

## 2019-12-30 RX ADMIN — Medication 1 PATCH: at 11:30

## 2019-12-30 RX ADMIN — CLOPIDOGREL BISULFATE 75 MILLIGRAM(S): 75 TABLET, FILM COATED ORAL at 06:12

## 2019-12-30 RX ADMIN — Medication 1 PATCH: at 11:46

## 2019-12-30 NOTE — DISCHARGE NOTE PROVIDER - HOSPITAL COURSE
70yo Male, current smoker, with SHELLFISH ALLERGY, PMHx of hypertriglyceridemia (on Lopid, non compliant) who presents to [facility] c/o CP. Troponins were negative x 3 and EKG showed normal sinus rhythm with not ST-T changes or evidence of ischemia. Patient was then admitted to [facility] for further management and cardiac catheterization Monday (12/30/2019).        Now s/p cardiac catheterization (12/30/2019) diagnostic for nonobstructive disease, 30% mLAD, 50% D1, EDP 11 mmHg. Echocardiogram (12/28/2019) revealed grossly normal left ventricular function with hypokinesis of the basal anterolateral wall in some views, EF 55%, aortic sclerosis without significant stenosis, and mild aortic regurgitation.        No significant events on telemetry overnight. Repeat EKG without ischemic changes. Patient has been medically cleared for discharge as per Dr. Arcos. Patient has been prescribed Metoprolol Succinate  mg once daily and Aspirin 81 mg once daily for his diagnosis of coronary artery disease. Patient has a history of hyperlipidemia and has been prescribed Atorvastatin 80 mg once daily at bedtime and Gemfibrozil 600 mg twice daily to help prevent progression and further plaque build up/ Patient also was newly diagnosed with diabetes mellitus with a hemoglobin A1C of 6.2 during this admission. Patient has been prescribed Metformin 500 mg twice daily for this diagnosis to prevent further progression. Patient has been diagnosed with neuropathy, likely secondary to diabetes mellitus, and has been prescribed Gabapentin 100 mg three times a day to prevent progression of this disease. Patient also has a history of smoking tobacco products and was provided with smoking cessation education. Patient has been prescribed nicotine patches 14 mg/24 hours. Patient has been given appropriate discharge instructions including medication regimen, access site management and follow up. Medications that patient needs refills on have been e-prescribed to preferred pharmacy.         Gen: NAD, A&O x3    Cards: RRR, clear S1 and S2 without murmur    Pulm: CTA B/L without w/r/r    Right Radial: No hematoma or ooze, peripheral pulses 2+ B/L    Abd: soft, NT    Ext: no LE edema or ulcerations B/L

## 2019-12-30 NOTE — DIETITIAN INITIAL EVALUATION ADULT. - ENERGY NEEDS
ABW used for calculations as pt between % of IBW.   ABW 71.9kg, IBW 78kg, 92% IBW, ht 71", BMI 22.1   Nutrient needs based on [facility] standards of care for maintenance in older adults

## 2019-12-30 NOTE — DISCHARGE NOTE PROVIDER - CARE PROVIDER_API CALL
Elyssa Saez)  Cardiovascular Disease; Internal Medicine  [address] [address]  Phone: [phone]  Fax: [phone]  Follow Up Time: 2 weeks    Juarez Dixon)  Cardiology; Interventional Cardiology  [address] [address]  Phone: [phone]  Fax: [phone]  Follow Up Time: 2 weeks
Cellulitis and abscess

## 2019-12-30 NOTE — DIETITIAN INITIAL EVALUATION ADULT. - OTHER INFO
69M current smoker, PMHx of hypertriglyceridemia () (on Lopid, non compliant) who presents to [facility] c/o CP, now admitted to [facility] for further management and cardiac catheterization today 12/30. Pt observed resting in bed noting he is weak and dizzy, wishing to rest. Noted fair intake at this time, typically with good intake at home, does not follow any dietary restrictions. Discussed purpose of DASH/TLC diet with pt, will follow for reinforcement. No noted n/v/d/c, chewing/ swallowing issues or pain impacting intake, skin intact. Confirms allergy to shellfish. Denies recent changes in wt. Will continue to follow per protocol.

## 2019-12-30 NOTE — DISCHARGE NOTE NURSING/CASE MANAGEMENT/SOCIAL WORK - PATIENT PORTAL LINK FT
You can access the FollowMyHealth Patient Portal offered by [facility] by registering at the following website: [email or web address]. By joining [facility]’s FollowMyHealth portal, you will also be able to view your health information using other applications (apps) compatible with our system.

## 2019-12-30 NOTE — DIETITIAN INITIAL EVALUATION ADULT. - PROBLEM SELECTOR PLAN 2
non compliant  -f/u AM lipids  -continue home gemfibrizol 600mg BID      VTE ppx: Lovenox SQ  Dispo: pending cath Monday

## 2019-12-30 NOTE — DISCHARGE NOTE PROVIDER - PROVIDER TOKENS
PROVIDER:[TOKEN:[[id number]],FOLLOWUP:[2 weeks]],PROVIDER:[TOKEN:[[id number]],FOLLOWUP:[2 weeks]]

## 2019-12-30 NOTE — DISCHARGE NOTE PROVIDER - NSDCMRMEDTOKEN_GEN_ALL_CORE_FT
Aspirin Enteric Coated 81 mg oral delayed release tablet: 1 tab(s) orally once a day  atorvastatin 80 mg oral tablet: 1 tab(s) orally once a day (at bedtime)  gabapentin 100 mg oral capsule: 1 cap(s) orally 3 times a day  gemfibrozil 600 mg oral tablet: 1 tab(s) orally 2 times a day  metFORMIN 500 mg oral tablet: 1 tab(s) orally 2 times a day   Metoprolol Succinate ER 25 mg oral tablet, extended release: 1 tab(s) orally once a day   nicotine 14 mg/24 hr transdermal film, extended release: 1 patch transdermal once a day

## 2019-12-30 NOTE — DISCHARGE NOTE PROVIDER - NSDCCPCAREPLAN_GEN_ALL_CORE_FT
PRINCIPAL DISCHARGE DIAGNOSIS  Diagnosis: CAD (coronary artery disease)  Assessment and Plan of Treatment: You have undergone a cardiac catheterization and have been diagnosed with nonobstructive coronary artery disease.  You have been started on Aspirin 81mg daily and metoprolol succinate ER 25 mg daily. You MUST continue taking the daily Aspirin to ensure the disease does not progress. DO NOT STOP THESE MEDICATIONS FOR ANY REASON UNLESS OTHERWISE INDICATED BY YOUR CARDIOLOGIST BECAUSE THIS WILL PUT YOU AT RISK FOR A HEART ATTACK. You should refrain from strenuous activity and heavy lifting for 1 week. Please make a follow up appointment with your cardiologist within 1-2 weeks of your discharge. All of your prescriptions have been sent electronically to your pharmacy.  The catheter from your wrist was removed and bleeding was stopped by manual pressure.  Wash the site daily with soap and water.  There is no need to put on a bandage.      Call the Interventional Cardiology and Vascular Team at [phone] or [phone] if any of following occur pertaining to your vascular access site: Bleeding or hematoma formation (collection of blood under the skin), drainage or redness at the puncture site, numbness, decrease in strength, coolness or pale coloration of skin of the leg or hand.      SECONDARY DISCHARGE DIAGNOSES  Diagnosis: Diabetes mellitus  Assessment and Plan of Treatment: You have been diagnosed with diabetes mellitus. Your hemoblogin A1C was 6.2 during this admission. Hemoglobin A1C is a reflection of your blood sugar levels over the previous 3 months. Due to this diagnosis, you have been started on Metformin 500 mg orally twice daily and you are to begin taking this medication on Thursday, 01/02/2020. This prescription has been electronically sent to your preferred pharmacy. You should continue to take these medications to prevent your disease from progressing.    Diagnosis: Hyperlipidemia  Assessment and Plan of Treatment: You have been diagnosed with hyperlipidemia. You have been prescribed Atorvastatin 80 mg orally at bedtime and Gemfibrozil 600 mg twice daily. These prescriptions have been electronically sent to your preferred pharmacy. You should continue to take these medications to prevent your disease from progressing and further plaque from building up in your arteries.    Diagnosis: Neuropathy  Assessment and Plan of Treatment: You have been diagnosed with neuropathy, likely secondary to diabetes mellitus. Your hemoglobin A1C was 6.2 during this admission. Hemoglobin A1C is a reflection of your blood sugar levels over the previous 3 months. Due to this diagnosis, you have been prescribed Metformin 500 mg twice daily and Gabapentin 100 mg three times daily. These prescriptions have been electronically sent to your preferred pharmacy. You should continue to take these medications to prevent your disease from progressing.    Diagnosis: History of smoking  Assessment and Plan of Treatment: You have a history of smoking tobacco products and was provided with education about smoking cessation. You have been prescribed nicotine patches 14 mg/24 hours. This prescription has been electronically sent to your preferred pharmacy.

## 2019-12-30 NOTE — DISCHARGE NOTE PROVIDER - CARE PROVIDERS DIRECT ADDRESSES
[email or web address]

## 2019-12-31 RX ORDER — GEMFIBROZIL 600 MG
1 TABLET ORAL
Qty: 0 | Refills: 0 | DISCHARGE

## 2019-12-31 RX ORDER — GABAPENTIN 400 MG/1
1 CAPSULE ORAL
Qty: 90 | Refills: 3
Start: 2019-12-31 | End: 2020-04-28

## 2019-12-31 RX ORDER — GEMFIBROZIL 600 MG
1 TABLET ORAL
Qty: 60 | Refills: 3
Start: 2019-12-31 | End: 2020-04-28

## 2020-01-02 RX ORDER — METFORMIN HYDROCHLORIDE 850 MG/1
1 TABLET ORAL
Qty: 60 | Refills: 0
Start: 2020-01-02 | End: 2020-01-31

## 2020-01-03 DIAGNOSIS — E78.5 HYPERLIPIDEMIA, UNSPECIFIED: ICD-10-CM

## 2020-01-03 DIAGNOSIS — I25.10 ATHEROSCLEROTIC HEART DISEASE OF NATIVE CORONARY ARTERY WITHOUT ANGINA PECTORIS: ICD-10-CM

## 2020-01-03 DIAGNOSIS — F17.210 NICOTINE DEPENDENCE, CIGARETTES, UNCOMPLICATED: ICD-10-CM

## 2020-01-03 DIAGNOSIS — Z91.14 PATIENT'S OTHER NONCOMPLIANCE WITH MEDICATION REGIMEN: ICD-10-CM

## 2020-01-03 DIAGNOSIS — R07.9 CHEST PAIN, UNSPECIFIED: ICD-10-CM

## 2020-01-03 DIAGNOSIS — E11.40 TYPE 2 DIABETES MELLITUS WITH DIABETIC NEUROPATHY, UNSPECIFIED: ICD-10-CM

## 2020-06-22 DIAGNOSIS — E11.40 TYPE 2 DIABETES MELLITUS WITH DIABETIC NEUROPATHY, UNSPECIFIED: ICD-10-CM

## 2021-01-05 DIAGNOSIS — R19.7 DIARRHEA, UNSPECIFIED: ICD-10-CM

## 2021-02-16 DIAGNOSIS — E78.1 PURE HYPERGLYCERIDEMIA: ICD-10-CM

## 2021-03-05 NOTE — DIETITIAN INITIAL EVALUATION ADULT. - +GENDER
Problem: Musculor/Skeletal Functional Status  Goal: Absence of falls  Outcome: Met This Shift  Note: Patient verbalizes understanding of fall precautions. Patient free from falls this visit. Intervention: Fall precautions  Note: Discussed fall precautions, call light within reach. Problem: Intellectual/Education/Knowledge Deficit  Goal: Teaching initiated upon admission  Outcome: Met This Shift  Note: Patient verbalizes understanding to verbal information given on vitamin b12,action and possible side effects. Aware to call MD if develop complications. Intervention: Verbal/written education provided  Note: Discussed vitamin b12 action, possible side effects and when to call the doctor. Problem: Discharge Planning  Goal: Knowledge of discharge instructions  Description: Knowledge of discharge instructions  Outcome: Met This Shift  Note: Verbalized understanding of discharge instructions, follow-up appointments, and when to call the physician. Intervention: Discharge to appropriate level of care  Note: Discuss discharge instructions, follow ups, and when to call the doctor. Care plan reviewed with patient. Patient verbalizes understanding of the plan of care and contribute to goal setting. Statement Selected

## 2022-04-18 RX ORDER — ASPIRIN 81 MG/1
81 TABLET, CHEWABLE ORAL DAILY
Qty: 90 | Refills: 3 | Status: ACTIVE | COMMUNITY
Start: 2022-04-18 | End: 1900-01-01

## 2022-09-23 DIAGNOSIS — E10.9 TYPE 1 DIABETES MELLITUS W/OUT COMPLICATIONS: ICD-10-CM

## 2022-12-01 NOTE — ED ADULT TRIAGE NOTE - HEIGHT IN INCHES
T(C): 36.6 (12-02-22 @ 02:00), Max: 38.5 (12-01-22 @ 18:25)  HR: 81 (12-02-22 @ 02:00) (80 - 131)  BP: 143/99 (12-02-22 @ 02:00) (117/77 - 167/98)  RR: 16 (12-01-22 @ 23:33) (16 - 21)  SpO2: 97% (12-02-22 @ 02:00) (92% - 97%)    CONSTITUTIONAL: Well groomed, no apparent distress  EYES: PERRLA and symmetric, EOMI, No conjunctival or scleral injection, non-icteric  ENMT: Oral mucosa with moist membranes. Normal dentition; no pharyngeal injection or exudates             NECK: Supple, symmetric and without tracheal deviation   RESP: No respiratory distress, no use of accessory muscles; CTA b/l, no WRR  CV: RRR, +S1S2, no MRG; no JVD; no peripheral edema  GI: Soft, NT, ND, no rebound, no guarding; no palpable masses; no hepatosplenomegaly; no hernia palpated  LYMPH: No cervical LAD or tenderness; no axillary LAD or tenderness; no inguinal LAD or tenderness  MSK: Normal ROM without pain, no spinal tenderness, normal muscle strength/tone  SKIN: No rashes or ulcers noted; no subcutaneous nodules or induration palpable  NEURO: CN II-XII intact; normal reflexes in upper and lower extremities, sensation intact in upper and lower extremities b/l to light touch   PSYCH: unable to assess in detail as patient is AMS
11

## 2023-09-19 DIAGNOSIS — H66.90 OTITIS MEDIA, UNSPECIFIED, UNSPECIFIED EAR: ICD-10-CM

## 2023-09-19 DIAGNOSIS — J03.90 ACUTE TONSILLITIS, UNSPECIFIED: ICD-10-CM

## 2023-09-19 RX ORDER — OFLOXACIN OTIC 3 MG/ML
0.3 SOLUTION AURICULAR (OTIC) TWICE DAILY
Qty: 2 | Refills: 0 | Status: ACTIVE | COMMUNITY
Start: 2023-09-19 | End: 1900-01-01

## 2023-11-14 NOTE — ED ADULT NURSE NOTE - NS ED NOTE ABUSE RESPONSE YN
pt A&ox4 , coming to ED from home for left shoulder pain that started yesterday afternoon. past medical history: HTN, DM2. pt states he recently had "calcium score that was elevated". at this time pt denies Chest pain and SOB. pt denies H/A , Dizziness , lightheadedness , and radiating chest pain. NSR on telemetry. breathing is spontaneous and unlabored. sating 99% on RA. right ac 20g IV placed Labs drawn and sent as per ordered. Bed in lowest position, call bell within reach, all other safety and comfort measures provided. awaiting labs results and further orders. Yes pt A&ox4 , coming to ED from home for left shoulder pain that started yesterday afternoon. past medical history: HTN, DM2. pt states he recently had "calcification score that was elevated". at this time pt denies Chest pain and SOB. pt denies H/A , Dizziness , lightheadedness , and radiating chest pain. NSR on telemetry. breathing is spontaneous and unlabored. sating 99% on RA. right ac 20g IV placed Labs drawn and sent as per ordered. Bed in lowest position, call bell within reach, all other safety and comfort measures provided. awaiting labs results and further orders.

## 2024-01-25 RX ORDER — GEMFIBROZIL 600 MG/1
600 TABLET, FILM COATED ORAL TWICE DAILY
Qty: 180 | Refills: 0 | Status: DISCONTINUED | COMMUNITY
Start: 2022-01-26 | End: 2024-01-25

## 2024-01-25 RX ORDER — INSULIN LISPRO 100 [IU]/ML
(75-25) 100 INJECTION, SUSPENSION SUBCUTANEOUS TWICE DAILY
Qty: 6 | Refills: 0 | Status: DISCONTINUED | COMMUNITY
Start: 2022-09-23 | End: 2024-01-25

## 2024-01-25 RX ORDER — SYRINGE-NEEDLE,INSULIN,0.5 ML 31 GX5/16"
31G X 5/16" SYRINGE, EMPTY DISPOSABLE MISCELLANEOUS
Qty: 180 | Refills: 3 | Status: DISCONTINUED | COMMUNITY
Start: 2022-09-23 | End: 2024-01-25

## 2024-01-25 RX ORDER — BLOOD-GLUCOSE METER
70 EACH MISCELLANEOUS
Qty: 1 | Refills: 0 | Status: DISCONTINUED | COMMUNITY
Start: 2020-06-22 | End: 2024-01-25

## 2024-01-25 RX ORDER — BLOOD SUGAR DIAGNOSTIC
STRIP MISCELLANEOUS 3 TIMES DAILY
Qty: 300 | Refills: 3 | Status: DISCONTINUED | COMMUNITY
Start: 2020-06-22 | End: 2024-01-25

## 2024-01-25 RX ORDER — AMOXICILLIN 500 MG/1
500 CAPSULE ORAL TWICE DAILY
Qty: 14 | Refills: 1 | Status: DISCONTINUED | COMMUNITY
Start: 2023-09-19 | End: 2024-01-25

## 2024-01-25 RX ORDER — GEMFIBROZIL 600 MG/1
600 TABLET, FILM COATED ORAL TWICE DAILY
Qty: 180 | Refills: 3 | Status: ACTIVE | COMMUNITY
Start: 2021-02-16 | End: 1900-01-01

## 2024-01-25 RX ORDER — AMOXICILLIN 500 MG/1
500 CAPSULE ORAL TWICE DAILY
Qty: 14 | Refills: 1 | Status: DISCONTINUED | COMMUNITY
Start: 2021-06-07 | End: 2024-01-25

## 2024-01-25 RX ORDER — RIFAXIMIN 200 MG/1
200 TABLET ORAL TWICE DAILY
Qty: 120 | Refills: 0 | Status: DISCONTINUED | COMMUNITY
Start: 2021-01-05 | End: 2024-01-25

## 2024-01-25 RX ORDER — LANCETS
EACH MISCELLANEOUS
Qty: 300 | Refills: 3 | Status: DISCONTINUED | COMMUNITY
Start: 2020-06-22 | End: 2024-01-25

## 2024-01-25 RX ORDER — BLOOD-GLUCOSE METER
W/DEVICE KIT MISCELLANEOUS
Qty: 1 | Refills: 0 | Status: COMPLETED | COMMUNITY
Start: 2020-06-22 | End: 2024-01-25

## 2024-03-19 RX ORDER — TAMSULOSIN HYDROCHLORIDE 0.4 MG/1
0.4 CAPSULE ORAL
Qty: 90 | Refills: 3 | Status: DISCONTINUED | COMMUNITY
Start: 2020-12-22 | End: 2024-03-19

## 2024-03-19 RX ORDER — METFORMIN HYDROCHLORIDE 500 MG/1
500 TABLET, COATED ORAL
Qty: 180 | Refills: 3 | Status: DISCONTINUED | COMMUNITY
Start: 2024-01-25 | End: 2024-03-19

## 2024-03-19 RX ORDER — COLCHICINE 0.6 MG/1
0.6 TABLET ORAL TWICE DAILY
Qty: 14 | Refills: 0 | Status: DISCONTINUED | COMMUNITY
Start: 2022-09-23 | End: 2024-03-19

## 2024-03-19 RX ORDER — GABAPENTIN 400 MG/1
400 CAPSULE ORAL 3 TIMES DAILY
Qty: 270 | Refills: 0 | Status: DISCONTINUED | COMMUNITY
Start: 2024-01-25 | End: 2024-03-19

## 2024-03-19 RX ORDER — AMOXICILLIN 500 MG/1
500 TABLET, FILM COATED ORAL
Qty: 20 | Refills: 1 | Status: ACTIVE | COMMUNITY
Start: 2023-10-20 | End: 1900-01-01

## 2024-08-26 NOTE — ED ADULT NURSE NOTE - NS PRO PASSIVE SMOKE EXP
Patient called and left a VM - states his employer needs a more specific note regarding restrictions.  Is patient allowed to pull weeds? Please call back to [phone].   Unknown

## 2024-09-26 RX ORDER — AZITHROMYCIN 500 MG/1
500 TABLET, FILM COATED ORAL DAILY
Qty: 1 | Refills: 1 | Status: ACTIVE | COMMUNITY
Start: 2024-09-26 | End: 1900-01-01

## 2025-01-07 DIAGNOSIS — J01.80 OTHER ACUTE SINUSITIS: ICD-10-CM

## 2025-01-07 RX ORDER — AMOXICILLIN AND CLAVULANATE POTASSIUM 500; 125 MG/1; MG/1
500-125 TABLET, FILM COATED ORAL
Qty: 14 | Refills: 1 | Status: ACTIVE | COMMUNITY
Start: 2025-01-07 | End: 1900-01-01

## 2025-06-30 NOTE — DIETITIAN INITIAL EVALUATION ADULT. - PROBLEM SELECTOR PLAN 1
Yes
currently CP free  -trop negative x 1, f/u repeat at 8pm and AM  -EKG non ischemic  -Echo: EF 55% hypokinesis of the basal anterolateral wall, mild AR  -plan for cardiac cath with Dr. Dixon on Monday, pt consented, NPO after MN on sunday, s/p ASA 162mg today at home, will give Plavix 600mg today  -discuss with Dr. Dixon regarding shellfish allergy (rash and swelling)  -f/u A1C and lipids in AM  -continue ASA 81mg, and start Plavix 75mg and metoprolol 25mg QD